# Patient Record
Sex: FEMALE | Race: WHITE | NOT HISPANIC OR LATINO | Employment: OTHER | URBAN - METROPOLITAN AREA
[De-identification: names, ages, dates, MRNs, and addresses within clinical notes are randomized per-mention and may not be internally consistent; named-entity substitution may affect disease eponyms.]

---

## 2017-06-26 ENCOUNTER — HOSPITAL ENCOUNTER (OUTPATIENT)
Dept: RADIOLOGY | Facility: MEDICAL CENTER | Age: 77
End: 2017-06-26

## 2020-01-01 ENCOUNTER — HOSPITAL ENCOUNTER (OUTPATIENT)
Facility: MEDICAL CENTER | Age: 80
DRG: 091 | End: 2020-04-12
Admitting: INTERNAL MEDICINE
Payer: MEDICARE

## 2020-01-01 ENCOUNTER — HOSPITAL ENCOUNTER (OUTPATIENT)
Dept: RADIOLOGY | Facility: MEDICAL CENTER | Age: 80
End: 2020-04-13
Payer: MEDICARE

## 2020-01-01 ENCOUNTER — APPOINTMENT (OUTPATIENT)
Dept: RADIOLOGY | Facility: MEDICAL CENTER | Age: 80
DRG: 091 | End: 2020-01-01
Attending: INTERNAL MEDICINE
Payer: MEDICARE

## 2020-01-01 ENCOUNTER — APPOINTMENT (OUTPATIENT)
Dept: CARDIOLOGY | Facility: MEDICAL CENTER | Age: 80
DRG: 091 | End: 2020-01-01
Attending: INTERNAL MEDICINE
Payer: MEDICARE

## 2020-01-01 ENCOUNTER — HOSPITAL ENCOUNTER (INPATIENT)
Facility: MEDICAL CENTER | Age: 80
LOS: 1 days | DRG: 091 | End: 2020-04-13
Attending: INTERNAL MEDICINE | Admitting: INTERNAL MEDICINE
Payer: MEDICARE

## 2020-01-01 VITALS
HEART RATE: 96 BPM | RESPIRATION RATE: 29 BRPM | TEMPERATURE: 98.7 F | WEIGHT: 160.94 LBS | SYSTOLIC BLOOD PRESSURE: 155 MMHG | HEIGHT: 60 IN | DIASTOLIC BLOOD PRESSURE: 67 MMHG | OXYGEN SATURATION: 48 % | BODY MASS INDEX: 31.6 KG/M2

## 2020-01-01 DIAGNOSIS — J96.00 ACUTE RESPIRATORY FAILURE, UNSP W HYPOXIA OR HYPERCAPNIA (HCC): ICD-10-CM

## 2020-01-01 DIAGNOSIS — I63.232 CEREBROVASCULAR ACCIDENT (CVA) DUE TO OCCLUSION OF LEFT CAROTID ARTERY (HCC): ICD-10-CM

## 2020-01-01 LAB
ACTION RANGE TRIGGERED IACRT: YES
ACTION RANGE TRIGGERED IACRT: YES
ALBUMIN SERPL BCP-MCNC: 3.2 G/DL (ref 3.2–4.9)
ALBUMIN/GLOB SERPL: 1.2 G/DL
ALP SERPL-CCNC: 116 U/L (ref 30–99)
ALT SERPL-CCNC: 43 U/L (ref 2–50)
ANION GAP SERPL CALC-SCNC: 12 MMOL/L (ref 7–16)
ANION GAP SERPL CALC-SCNC: 15 MMOL/L (ref 7–16)
APTT PPP: 22.4 SEC (ref 24.7–36)
AST SERPL-CCNC: 57 U/L (ref 12–45)
BASE EXCESS BLDA CALC-SCNC: -4 MMOL/L (ref -4–3)
BASE EXCESS BLDA CALC-SCNC: -4 MMOL/L (ref -4–3)
BASOPHILS # BLD AUTO: 0.2 % (ref 0–1.8)
BASOPHILS # BLD: 0.03 K/UL (ref 0–0.12)
BILIRUB SERPL-MCNC: 0.4 MG/DL (ref 0.1–1.5)
BODY TEMPERATURE: ABNORMAL DEGREES
BODY TEMPERATURE: ABNORMAL DEGREES
BUN SERPL-MCNC: 23 MG/DL (ref 8–22)
BUN SERPL-MCNC: 27 MG/DL (ref 8–22)
CALCIUM SERPL-MCNC: 8.6 MG/DL (ref 8.5–10.5)
CALCIUM SERPL-MCNC: 8.7 MG/DL (ref 8.5–10.5)
CHLORIDE SERPL-SCNC: 113 MMOL/L (ref 96–112)
CHLORIDE SERPL-SCNC: 93 MMOL/L (ref 96–112)
CO2 BLDA-SCNC: 27 MMOL/L (ref 20–33)
CO2 BLDA-SCNC: 27 MMOL/L (ref 20–33)
CO2 SERPL-SCNC: 20 MMOL/L (ref 20–33)
CO2 SERPL-SCNC: 23 MMOL/L (ref 20–33)
CREAT SERPL-MCNC: 1.05 MG/DL (ref 0.5–1.4)
CREAT SERPL-MCNC: 1.27 MG/DL (ref 0.5–1.4)
EKG IMPRESSION: NORMAL
EOSINOPHIL # BLD AUTO: 0 K/UL (ref 0–0.51)
EOSINOPHIL NFR BLD: 0 % (ref 0–6.9)
ERYTHROCYTE [DISTWIDTH] IN BLOOD BY AUTOMATED COUNT: 51.7 FL (ref 35.9–50)
GLOBULIN SER CALC-MCNC: 2.6 G/DL (ref 1.9–3.5)
GLUCOSE SERPL-MCNC: 121 MG/DL (ref 65–99)
GLUCOSE SERPL-MCNC: 143 MG/DL (ref 65–99)
HCO3 BLDA-SCNC: 25.1 MMOL/L (ref 17–25)
HCO3 BLDA-SCNC: 25.4 MMOL/L (ref 17–25)
HCT VFR BLD AUTO: 32.4 % (ref 37–47)
HGB BLD-MCNC: 10 G/DL (ref 12–16)
HOROWITZ INDEX BLDA+IHG-RTO: 192 MM[HG]
HOROWITZ INDEX BLDA+IHG-RTO: 337 MM[HG]
IMM GRANULOCYTES # BLD AUTO: 0.19 K/UL (ref 0–0.11)
IMM GRANULOCYTES NFR BLD AUTO: 1.1 % (ref 0–0.9)
INR PPP: 1.05 (ref 0.87–1.13)
INST. QUALIFIED PATIENT IIQPT: YES
INST. QUALIFIED PATIENT IIQPT: YES
LACTATE BLD-SCNC: 1.4 MMOL/L (ref 0.5–2)
LV EJECT FRACT  99904: 55
LV EJECT FRACT MOD 2C 99903: 46.65
LV EJECT FRACT MOD 4C 99902: 64.88
LV EJECT FRACT MOD BP 99901: 51.97
LYMPHOCYTES # BLD AUTO: 0.91 K/UL (ref 1–4.8)
LYMPHOCYTES NFR BLD: 5.3 % (ref 22–41)
MAGNESIUM SERPL-MCNC: 1.8 MG/DL (ref 1.5–2.5)
MCH RBC QN AUTO: 30.1 PG (ref 27–33)
MCHC RBC AUTO-ENTMCNC: 30.9 G/DL (ref 33.6–35)
MCV RBC AUTO: 97.6 FL (ref 81.4–97.8)
MONOCYTES # BLD AUTO: 0.82 K/UL (ref 0–0.85)
MONOCYTES NFR BLD AUTO: 4.8 % (ref 0–13.4)
NEUTROPHILS # BLD AUTO: 15.08 K/UL (ref 2–7.15)
NEUTROPHILS NFR BLD: 88.6 % (ref 44–72)
NRBC # BLD AUTO: 0 K/UL
NRBC BLD-RTO: 0 /100 WBC
O2/TOTAL GAS SETTING VFR VENT: 100 %
O2/TOTAL GAS SETTING VFR VENT: 100 %
OSMOLALITY SERPL: 335 MOSM/KG H2O (ref 278–298)
PCO2 BLDA: 63 MMHG (ref 26–37)
PCO2 BLDA: 65.9 MMHG (ref 26–37)
PCO2 TEMP ADJ BLDA: 62.3 MMHG (ref 26–37)
PCO2 TEMP ADJ BLDA: 65.9 MMHG (ref 26–37)
PH BLDA: 7.19 [PH] (ref 7.4–7.5)
PH BLDA: 7.21 [PH] (ref 7.4–7.5)
PH TEMP ADJ BLDA: 7.19 [PH] (ref 7.4–7.5)
PH TEMP ADJ BLDA: 7.21 [PH] (ref 7.4–7.5)
PHOSPHATE SERPL-MCNC: 2.9 MG/DL (ref 2.5–4.5)
PLATELET # BLD AUTO: 157 K/UL (ref 164–446)
PMV BLD AUTO: 11.2 FL (ref 9–12.9)
PO2 BLDA: 192 MMHG (ref 64–87)
PO2 BLDA: 337 MMHG (ref 64–87)
PO2 TEMP ADJ BLDA: 192 MMHG (ref 64–87)
PO2 TEMP ADJ BLDA: 336 MMHG (ref 64–87)
POTASSIUM SERPL-SCNC: 3.7 MMOL/L (ref 3.6–5.5)
POTASSIUM SERPL-SCNC: 3.9 MMOL/L (ref 3.6–5.5)
PROT SERPL-MCNC: 5.8 G/DL (ref 6–8.2)
PROTHROMBIN TIME: 14 SEC (ref 12–14.6)
RBC # BLD AUTO: 3.32 M/UL (ref 4.2–5.4)
SAO2 % BLDA: 100 % (ref 93–99)
SAO2 % BLDA: 99 % (ref 93–99)
SODIUM SERPL-SCNC: 128 MMOL/L (ref 135–145)
SODIUM SERPL-SCNC: 142 MMOL/L (ref 135–145)
SODIUM SERPL-SCNC: 148 MMOL/L (ref 135–145)
SODIUM SERPL-SCNC: 152 MMOL/L (ref 135–145)
SPECIMEN DRAWN FROM PATIENT: ABNORMAL
SPECIMEN DRAWN FROM PATIENT: ABNORMAL
TROPONIN T SERPL-MCNC: 117 NG/L (ref 6–19)
TROPONIN T SERPL-MCNC: 1931 NG/L (ref 6–19)
TROPONIN T SERPL-MCNC: 2344 NG/L (ref 6–19)
TROPONIN T SERPL-MCNC: 267 NG/L (ref 6–19)
TROPONIN T SERPL-MCNC: 759 NG/L (ref 6–19)
WBC # BLD AUTO: 17 K/UL (ref 4.8–10.8)

## 2020-01-01 PROCEDURE — 93306 TTE W/DOPPLER COMPLETE: CPT | Mod: 26 | Performed by: INTERNAL MEDICINE

## 2020-01-01 PROCEDURE — 700101 HCHG RX REV CODE 250: Performed by: INTERNAL MEDICINE

## 2020-01-01 PROCEDURE — 700105 HCHG RX REV CODE 258: Performed by: INTERNAL MEDICINE

## 2020-01-01 PROCEDURE — 700111 HCHG RX REV CODE 636 W/ 250 OVERRIDE (IP)

## 2020-01-01 PROCEDURE — 0BH18EZ INSERTION OF ENDOTRACHEAL AIRWAY INTO TRACHEA, VIA NATURAL OR ARTIFICIAL OPENING ENDOSCOPIC: ICD-10-PCS | Performed by: INTERNAL MEDICINE

## 2020-01-01 PROCEDURE — 83930 ASSAY OF BLOOD OSMOLALITY: CPT

## 2020-01-01 PROCEDURE — C1751 CATH, INF, PER/CENT/MIDLINE: HCPCS

## 2020-01-01 PROCEDURE — 85025 COMPLETE CBC W/AUTO DIFF WBC: CPT

## 2020-01-01 PROCEDURE — 93010 ELECTROCARDIOGRAM REPORT: CPT | Performed by: INTERNAL MEDICINE

## 2020-01-01 PROCEDURE — 94002 VENT MGMT INPAT INIT DAY: CPT

## 2020-01-01 PROCEDURE — 85730 THROMBOPLASTIN TIME PARTIAL: CPT

## 2020-01-01 PROCEDURE — 99292 CRITICAL CARE ADDL 30 MIN: CPT | Mod: 25 | Performed by: INTERNAL MEDICINE

## 2020-01-01 PROCEDURE — 85610 PROTHROMBIN TIME: CPT

## 2020-01-01 PROCEDURE — 99152 MOD SED SAME PHYS/QHP 5/>YRS: CPT

## 2020-01-01 PROCEDURE — 82803 BLOOD GASES ANY COMBINATION: CPT

## 2020-01-01 PROCEDURE — 84484 ASSAY OF TROPONIN QUANT: CPT | Mod: 91

## 2020-01-01 PROCEDURE — 93005 ELECTROCARDIOGRAM TRACING: CPT | Performed by: INTERNAL MEDICINE

## 2020-01-01 PROCEDURE — 302214 INTUBATION BOX: Performed by: INTERNAL MEDICINE

## 2020-01-01 PROCEDURE — 84295 ASSAY OF SERUM SODIUM: CPT | Mod: 91

## 2020-01-01 PROCEDURE — 83735 ASSAY OF MAGNESIUM: CPT

## 2020-01-01 PROCEDURE — 02HV33Z INSERTION OF INFUSION DEVICE INTO SUPERIOR VENA CAVA, PERCUTANEOUS APPROACH: ICD-10-PCS | Performed by: INTERNAL MEDICINE

## 2020-01-01 PROCEDURE — 700111 HCHG RX REV CODE 636 W/ 250 OVERRIDE (IP): Performed by: INTERNAL MEDICINE

## 2020-01-01 PROCEDURE — 93306 TTE W/DOPPLER COMPLETE: CPT

## 2020-01-01 PROCEDURE — 31500 INSERT EMERGENCY AIRWAY: CPT | Performed by: INTERNAL MEDICINE

## 2020-01-01 PROCEDURE — 36556 INSERT NON-TUNNEL CV CATH: CPT

## 2020-01-01 PROCEDURE — 36556 INSERT NON-TUNNEL CV CATH: CPT | Mod: LT | Performed by: INTERNAL MEDICINE

## 2020-01-01 PROCEDURE — 770022 HCHG ROOM/CARE - ICU (200)

## 2020-01-01 PROCEDURE — 83605 ASSAY OF LACTIC ACID: CPT

## 2020-01-01 PROCEDURE — 80048 BASIC METABOLIC PNL TOTAL CA: CPT

## 2020-01-01 PROCEDURE — 99291 CRITICAL CARE FIRST HOUR: CPT | Mod: 25 | Performed by: INTERNAL MEDICINE

## 2020-01-01 PROCEDURE — 31500 INSERT EMERGENCY AIRWAY: CPT

## 2020-01-01 PROCEDURE — 5A1935Z RESPIRATORY VENTILATION, LESS THAN 24 CONSECUTIVE HOURS: ICD-10-PCS | Performed by: INTERNAL MEDICINE

## 2020-01-01 PROCEDURE — 84100 ASSAY OF PHOSPHORUS: CPT

## 2020-01-01 PROCEDURE — 71045 X-RAY EXAM CHEST 1 VIEW: CPT

## 2020-01-01 PROCEDURE — 80053 COMPREHEN METABOLIC PANEL: CPT

## 2020-01-01 RX ORDER — ATORVASTATIN CALCIUM 40 MG/1
40 TABLET, FILM COATED ORAL EVERY EVENING
Status: DISCONTINUED | OUTPATIENT
Start: 2020-01-01 | End: 2020-01-01

## 2020-01-01 RX ORDER — ASPIRIN 81 MG/1
81 TABLET, CHEWABLE ORAL DAILY
Status: DISCONTINUED | OUTPATIENT
Start: 2020-01-01 | End: 2020-01-01

## 2020-01-01 RX ORDER — LORAZEPAM 2 MG/ML
1 CONCENTRATE ORAL
Status: DISCONTINUED | OUTPATIENT
Start: 2020-01-01 | End: 2020-01-01 | Stop reason: HOSPADM

## 2020-01-01 RX ORDER — FAMOTIDINE 20 MG/1
20 TABLET, FILM COATED ORAL EVERY 12 HOURS
Status: DISCONTINUED | OUTPATIENT
Start: 2020-01-01 | End: 2020-01-01

## 2020-01-01 RX ORDER — MANNITOL 250 MG/ML
INJECTION, SOLUTION INTRAVENOUS
Status: COMPLETED
Start: 2020-01-01 | End: 2020-01-01

## 2020-01-01 RX ORDER — AMOXICILLIN 250 MG
2 CAPSULE ORAL 2 TIMES DAILY
Status: DISCONTINUED | OUTPATIENT
Start: 2020-01-01 | End: 2020-01-01

## 2020-01-01 RX ORDER — 3% SODIUM CHLORIDE 3 G/100ML
INJECTION, SOLUTION INTRAVENOUS CONTINUOUS
Status: DISCONTINUED | OUTPATIENT
Start: 2020-01-01 | End: 2020-01-01

## 2020-01-01 RX ORDER — POLYETHYLENE GLYCOL 3350 17 G/17G
1 POWDER, FOR SOLUTION ORAL
Status: DISCONTINUED | OUTPATIENT
Start: 2020-01-01 | End: 2020-01-01

## 2020-01-01 RX ORDER — MAGNESIUM SULFATE HEPTAHYDRATE 40 MG/ML
2 INJECTION, SOLUTION INTRAVENOUS ONCE
Status: COMPLETED | OUTPATIENT
Start: 2020-01-01 | End: 2020-01-01

## 2020-01-01 RX ORDER — FAMOTIDINE 20 MG/1
20 TABLET, FILM COATED ORAL EVERY 24 HOURS
Status: DISCONTINUED | OUTPATIENT
Start: 2020-01-01 | End: 2020-01-01

## 2020-01-01 RX ORDER — MORPHINE SULFATE 10 MG/ML
5 INJECTION, SOLUTION INTRAMUSCULAR; INTRAVENOUS
Status: DISCONTINUED | OUTPATIENT
Start: 2020-01-01 | End: 2020-01-01 | Stop reason: HOSPADM

## 2020-01-01 RX ORDER — 3% SODIUM CHLORIDE 3 G/100ML
500 INJECTION, SOLUTION INTRAVENOUS CONTINUOUS
Status: DISCONTINUED | OUTPATIENT
Start: 2020-01-01 | End: 2020-01-01

## 2020-01-01 RX ORDER — LORAZEPAM 2 MG/ML
2-4 INJECTION INTRAMUSCULAR
Status: DISCONTINUED | OUTPATIENT
Start: 2020-01-01 | End: 2020-01-01 | Stop reason: HOSPADM

## 2020-01-01 RX ORDER — ONDANSETRON 4 MG/1
8 TABLET, ORALLY DISINTEGRATING ORAL EVERY 8 HOURS PRN
Status: DISCONTINUED | OUTPATIENT
Start: 2020-01-01 | End: 2020-01-01 | Stop reason: HOSPADM

## 2020-01-01 RX ORDER — ETOMIDATE 2 MG/ML
20 INJECTION INTRAVENOUS ONCE
Status: COMPLETED | OUTPATIENT
Start: 2020-01-01 | End: 2020-01-01

## 2020-01-01 RX ORDER — ATROPINE SULFATE 10 MG/ML
2 SOLUTION/ DROPS OPHTHALMIC EVERY 4 HOURS PRN
Status: DISCONTINUED | OUTPATIENT
Start: 2020-01-01 | End: 2020-01-01 | Stop reason: HOSPADM

## 2020-01-01 RX ORDER — MORPHINE SULFATE 10 MG/ML
10 INJECTION, SOLUTION INTRAMUSCULAR; INTRAVENOUS
Status: DISCONTINUED | OUTPATIENT
Start: 2020-01-01 | End: 2020-01-01 | Stop reason: HOSPADM

## 2020-01-01 RX ORDER — NOREPINEPHRINE BITARTRATE 1 MG/ML
INJECTION, SOLUTION INTRAVENOUS
Status: ACTIVE
Start: 2020-01-01 | End: 2020-01-01

## 2020-01-01 RX ORDER — ONDANSETRON 2 MG/ML
8 INJECTION INTRAMUSCULAR; INTRAVENOUS EVERY 8 HOURS PRN
Status: DISCONTINUED | OUTPATIENT
Start: 2020-01-01 | End: 2020-01-01 | Stop reason: HOSPADM

## 2020-01-01 RX ORDER — POTASSIUM CHLORIDE 29.8 MG/ML
40 INJECTION INTRAVENOUS ONCE
Status: COMPLETED | OUTPATIENT
Start: 2020-01-01 | End: 2020-01-01

## 2020-01-01 RX ORDER — MAGNESIUM SULFATE HEPTAHYDRATE 40 MG/ML
2 INJECTION, SOLUTION INTRAVENOUS ONCE
Status: DISCONTINUED | OUTPATIENT
Start: 2020-01-01 | End: 2020-01-01

## 2020-01-01 RX ORDER — HEPARIN SODIUM 5000 [USP'U]/ML
5000 INJECTION, SOLUTION INTRAVENOUS; SUBCUTANEOUS EVERY 8 HOURS
Status: DISCONTINUED | OUTPATIENT
Start: 2020-01-01 | End: 2020-01-01

## 2020-01-01 RX ORDER — MANNITOL 250 MG/ML
50 INJECTION, SOLUTION INTRAVENOUS ONCE
Status: COMPLETED | OUTPATIENT
Start: 2020-01-01 | End: 2020-01-01

## 2020-01-01 RX ORDER — ROCURONIUM BROMIDE 10 MG/ML
50 INJECTION, SOLUTION INTRAVENOUS ONCE
Status: COMPLETED | OUTPATIENT
Start: 2020-01-01 | End: 2020-01-01

## 2020-01-01 RX ORDER — IPRATROPIUM BROMIDE AND ALBUTEROL SULFATE 2.5; .5 MG/3ML; MG/3ML
3 SOLUTION RESPIRATORY (INHALATION)
Status: DISCONTINUED | OUTPATIENT
Start: 2020-01-01 | End: 2020-01-01

## 2020-01-01 RX ORDER — BISACODYL 10 MG
10 SUPPOSITORY, RECTAL RECTAL
Status: DISCONTINUED | OUTPATIENT
Start: 2020-01-01 | End: 2020-01-01

## 2020-01-01 RX ADMIN — FENTANYL CITRATE 100 MCG: 0.05 INJECTION, SOLUTION INTRAMUSCULAR; INTRAVENOUS at 02:22

## 2020-01-01 RX ADMIN — MANNITOL 50 G: 12.5 INJECTION, SOLUTION INTRAVENOUS at 02:18

## 2020-01-01 RX ADMIN — MANNITOL 50 G: 250 INJECTION, SOLUTION INTRAVENOUS at 02:18

## 2020-01-01 RX ADMIN — POTASSIUM CHLORIDE 40 MEQ: 400 INJECTION, SOLUTION INTRAVENOUS at 12:04

## 2020-01-01 RX ADMIN — MORPHINE SULFATE 10 MG: 10 INJECTION INTRAVENOUS at 18:45

## 2020-01-01 RX ADMIN — Medication 100 MCG/HR: at 06:35

## 2020-01-01 RX ADMIN — PROPOFOL 20 MCG/KG/MIN: 10 INJECTION, EMULSION INTRAVENOUS at 03:59

## 2020-01-01 RX ADMIN — ETOMIDATE 20 MG: 2 INJECTION INTRAVENOUS at 01:57

## 2020-01-01 RX ADMIN — FAMOTIDINE 20 MG: 10 INJECTION INTRAVENOUS at 05:41

## 2020-01-01 RX ADMIN — ROCURONIUM BROMIDE 50 MG: 10 INJECTION, SOLUTION INTRAVENOUS at 01:57

## 2020-01-01 RX ADMIN — NOREPINEPHRINE BITARTRATE 5 MCG/MIN: 1 INJECTION INTRAVENOUS at 06:11

## 2020-01-01 RX ADMIN — SODIUM CHLORIDE 200 MEQ: 234 INJECTION INTRAMUSCULAR; INTRAVENOUS; SUBCUTANEOUS at 02:32

## 2020-01-01 RX ADMIN — SODIUM CHLORIDE: 3 INJECTION, SOLUTION INTRAVENOUS at 04:38

## 2020-01-01 RX ADMIN — LORAZEPAM 4 MG: 2 INJECTION INTRAMUSCULAR; INTRAVENOUS at 18:14

## 2020-01-01 RX ADMIN — MORPHINE SULFATE 10 MG: 10 INJECTION INTRAVENOUS at 18:14

## 2020-01-01 RX ADMIN — FENTANYL CITRATE 50 MCG: 0.05 INJECTION, SOLUTION INTRAMUSCULAR; INTRAVENOUS at 02:02

## 2020-01-01 RX ADMIN — FENTANYL CITRATE 50 MCG: 0.05 INJECTION, SOLUTION INTRAMUSCULAR; INTRAVENOUS at 01:55

## 2020-01-01 RX ADMIN — MAGNESIUM SULFATE 2 G: 2 INJECTION INTRAVENOUS at 08:07

## 2020-04-13 PROBLEM — J96.01 ACUTE RESPIRATORY FAILURE WITH HYPOXIA AND HYPERCAPNIA (HCC): Status: ACTIVE | Noted: 2020-01-01

## 2020-04-13 PROBLEM — E83.42 HYPOMAGNESEMIA: Status: ACTIVE | Noted: 2020-01-01

## 2020-04-13 PROBLEM — J96.02 ACUTE RESPIRATORY FAILURE WITH HYPOXIA AND HYPERCAPNIA (HCC): Status: ACTIVE | Noted: 2020-01-01

## 2020-04-13 PROBLEM — I21.09 ACUTE MI, ANTEROLATERAL WALL (HCC): Status: ACTIVE | Noted: 2020-01-01

## 2020-04-13 PROBLEM — T81.9XXA POST-OPERATIVE COMPLICATION: Status: ACTIVE | Noted: 2020-01-01

## 2020-04-13 PROBLEM — I63.232 ARTERIAL ISCHEMIC STROKE, ICA (INTERNAL CAROTID ARTERY), LEFT, ACUTE (HCC): Status: ACTIVE | Noted: 2020-01-01

## 2020-04-13 NOTE — PROGRESS NOTES
Received transfer request from [facility]  Sending Physician: Dr. Bojorquez  Diagnosis: Ischemic Stroke with Early Herniation  Type of transfer requested: Inpatient to inpatient  Specialist Consulted: Dr. Luo- Neurosurgery  Patient Accepted  Accepting Physician: Dr. Krishna  Patient coming via: [facility]  ETA: 0130  Room Assignment: R103  Nursing to notify Dr. Krishna upon patient arrival to unit.

## 2020-04-13 NOTE — ASSESSMENT & PLAN NOTE
Diffuse ST depressions with rise in troponin  Due to patient's large ischemic stroke, starting heparin is at high risk of hemorrhagic conversion  High dose statin  ASA 81mg   
Impending herniation  S/p 23% saline bolus and mannitol  Continue 3% saline for Na goal at 150-160  SBP goal > 140 with levophed titration  No NSG interventions at this time  Continue high rate on vent  Will start low dose aspirin for ischemic stroke and NSTEMI  High statin  Poor prognosis  
Intubated earlier this morning on 4/13  Continue full vent support  Cont RT/O2 protocols  Continue vent bundle protocols  I am actively adjusting vent settings based on ABGs  No SBT for now  
Replete aggressively for goal >2  
S/p left CEA at [facility] and showed evidence of a perioperative stroke. Despite clot removal and take back to the operating room, she suffered a rapidly  progressive non-hemorrhagic stroke at the outside facility with a midline shift by CT  
27.7

## 2020-04-13 NOTE — PROCEDURES
Central Line Insertion  Date/Time: 4/13/2020 3:15 AM  Performed by: Christopher Krishna M.D.  Authorized by: Christopher Krishna M.D.     Consent:     Consent obtained:  Emergent situation    Risks discussed:  Arterial puncture, incorrect placement, nerve damage, pneumothorax, infection and bleeding    Alternatives discussed:  Delayed treatment  Pre-procedure details:     Hand hygiene: Hand hygiene performed prior to insertion      Sterile barrier technique: All elements of maximal sterile technique followed      Skin preparation:  ChloraPrep    Skin preparation agent: Skin preparation agent completely dried prior to procedure    Sedation:     Sedation type:  Deep  Anesthesia:     Anesthesia method:  None  Procedure details:     Location:  L subclavian    Patient position:  Flat    Procedural supplies:  Triple lumen    Catheter size:  7.5 Fr    Landmarks identified: yes      Ultrasound guidance: no      Number of attempts:  1    Successful placement: yes    Post-procedure details:     Post-procedure:  Dressing applied and line sutured    Assessment:  Blood return through all ports, free fluid flow and no pneumothorax on x-ray  Comments:      First attempt line went up into IJ, rewired and line went  into distal SVC/ right atrium, no complications

## 2020-04-13 NOTE — PALLIATIVE CARE
PALLIATIVE CARE FOLLOW-UP:    Call to PC office from ICU RN - pt's family changed mind re:  visit, and now would like one. Spiritual care order placed/spoke with Chaphugo Valero who plans to come to bedside soon.    Thank you for allowing Palliative Care to support this pt and her family.  Contact [phone] for additional assistance, patient status change, questions or concerns.

## 2020-04-13 NOTE — PALLIATIVE CARE
Palliative Care follow-up  PC RN reached out to pt's son Alejandro. Alejandro is agreeable and would appreciate phone conference. Plan for conference today at 3pm via phone.       Updated: Gala PUTNAM RN and Dr. Zafar    Plan: Family conference today at 3pm.     Thank you for allowing Palliative Care to support this patient and family. Contact [phone] for additional assistance, change in patient status, or with any questions/concerns.

## 2020-04-13 NOTE — PROGRESS NOTES
Interval history  Patient had elevated troponins in the 100s, ST elevation is persistent seen on EKG exam is improved she is able to withdraw bilateral lower extremities and left upper extremity she does not follow commands she has cranial nerve reflexes at this time she is on the vent    Physical examination  She is intubated she has minimal sedation on board she does not follow commands does not open eyes she has a GCS of 6T  Movement patient has left upper extremity bilateral lower extremities withdraws to pain no movement right upper extremity  Pupils remain 4-2 bilaterally she has full cranial nerve reflexes    Lab Results   Component Value Date/Time    SODIUM 148 (H) 04/13/2020 09:45 AM    POTASSIUM 3.7 04/13/2020 09:45 AM    CHLORIDE 113 (H) 04/13/2020 09:45 AM    CO2 20 04/13/2020 09:45 AM    GLUCOSE 143 (H) 04/13/2020 09:45 AM    BUN 27 (H) 04/13/2020 09:45 AM    CREATININE 1.27 04/13/2020 09:45 AM      Imaging no new imaging    Assessment and plan  At this point the patient remains a poor surgical candidate she has significantly elevated troponins consistent with myocardial infarction potentially due to neurogenic stress versus vasculopathy.  Her exam remains poor she is not responsive not following commands and has a stable physical examination from that of her pre-intubation examination from earlier today.    At this time the patient is a poor candidate for intervention no neurosurgical intervention surgically she was precluded from going to the OR as she may have cardiac demise due to the stress of the surgery.  Additionally the patient is coagulopathic from antiplatelet therapy from her CEA.  Which would require platelet therapy being administered and likely exacerbate her ongoing MI.  We will continue with following recommendations  Maximal medical management including sodium goals of 1 55-1 65  Keppra 500 twice daily  Stroke neurology consultation    At this time the patient is not a candidate for  ventriculostomy or decompressive hemicraniectomy given active cardiac instability coagulopathy and lack of radiographic evidence requiring decompressive hemicraniectomy at this time.    Christopher Luo MD  A total of 50 minutes was spent in addition to her consultation note evaluating and direct patient care coordination

## 2020-04-13 NOTE — PROGRESS NOTES
Critical Care Progress Note    Date of admission  4/13/2020    Chief Complaint  80 y.o. female admitted 4/13/2020 with acute hypoxic respiratory failure, neurogenic shock, and NSTEMI after massive left hemispheric ischemic stroke    Hospital Course    Ms. Carver is an 80 year old lady with the past medical history significant for AAA s/p stent graft in 2015, hypertension, hypothyroidism, PVD who underwent a previous right CEA in 11/2019, but underwent left CEA on 4/11/2020 for critical stenosis.  According to chart notes from [facility], the patient had decreased movement to the RUE/RLE immediately post-operatively.  An ultrasound showed thrombus in the left ICA and was given additional heparin and taken back to the OR for clot removal.  Over the course of the next 12- 24 hours on 4/12, the patient had worsening stroke symptoms of flaccid paralysis on the RUE/RLE and had worsening responsiveness/non-verbal.  A CT head from [facility] 5 hours prior to transfer to [facility] showed a large subacute nonhemorrhagic stroke involving the left front, parietal, and temporal lobes in the distribution of the left MCA with mass effect and midline shift towards the right of 3mm.  The patient was transferred from [facility] for neurosurgery evaluation.  She was immediately intubated upon arrival.  NSG was consulted and could not offer any surgical interventions due to the patient's instability.      Interval Problem Update  Reviewed last 24 hour events:   - transferred from [facility] this morning   - intubated this morning due to hypoxia and not protecting airway   - RUE flaccid, not following   - Fent at 50   - Levo at 9   - 3% at 45cc/hr   - SR 80s   - SBP >140   - afebrile   - NPO   - No BM   - UOP of 30-40cc/hr with gamino   - left subclavian TLC   - vent day #1   - CXR(reviewed): ?cardiomegaly, mild cephalization   - no SBTs   - Mg at 1.8   - K at 3.9   - serum osmolality at 335   - troponin 117-->267-->759-->1931      Review of Systems  Review of  Systems   Unable to perform ROS: Intubated        Vital Signs for last 24 hours   Temp:  [37 °C (98.6 °F)-37.1 °C (98.7 °F)] 37 °C (98.6 °F)  Pulse:  [106-120] 111  Resp:  [9-135] 21  BP: (122-165)/(56-73) 131/56  SpO2:  [91 %-98 %] 98 %    Hemodynamic parameters for last 24 hours       Respiratory Information for the last 24 hours  Vent Mode: APVCMV  Rate (breaths/min): 24  Vt Target (mL): 310  PEEP/CPAP: 8  MAP: 13  Control VTE (exp VT): 308    Physical Exam   Physical Exam  Vitals signs and nursing note reviewed.   Constitutional:       Appearance: Normal appearance. She is ill-appearing. She is not toxic-appearing or diaphoretic.      Comments: Appears stated age   HENT:      Head: Normocephalic and atraumatic.      Right Ear: External ear normal.      Left Ear: External ear normal.      Nose: Nose normal. No rhinorrhea.      Mouth/Throat:      Mouth: Mucous membranes are moist.      Comments: ETT in place  Eyes:      General: No scleral icterus.     Extraocular Movements: Extraocular movements intact.      Conjunctiva/sclera: Conjunctivae normal.      Pupils: Pupils are equal, round, and reactive to light.   Neck:      Musculoskeletal: Normal range of motion and neck supple.   Cardiovascular:      Rate and Rhythm: Normal rate and regular rhythm.      Pulses: Normal pulses.      Heart sounds: Normal heart sounds. No murmur.   Pulmonary:      Effort: No respiratory distress.      Breath sounds: Normal breath sounds.      Comments: Breathing comfortably on the vent, clear  Chest:      Chest wall: No tenderness.   Abdominal:      General: Abdomen is flat. There is no distension.      Palpations: Abdomen is soft.      Tenderness: There is no abdominal tenderness. There is no guarding or rebound.      Comments: Hypoactive bowel sounds   Musculoskeletal: Normal range of motion.      Right lower leg: No edema.      Left lower leg: No edema.   Lymphadenopathy:      Cervical: No cervical adenopathy.   Skin:     General:  Skin is warm and dry.      Capillary Refill: Capillary refill takes less than 2 seconds.      Findings: No rash.   Neurological:      Comments: Not following commands, strong cough, faint corneal reflex, no gag, RUE flaccid, withdraws x 3 on other extremities   Psychiatric:      Comments: Unable to assess         Medications  Current Facility-Administered Medications   Medication Dose Route Frequency Provider Last Rate Last Dose   • NOREPINEPHRINE BITARTRATE 1 MG/ML IV SOLN        Stopped at 04/13/20 0200   • norepinephrine (LEVOPHED) 8 mg in  mL Infusion  0-30 mcg/min Intravenous Continuous Christopher Krishna M.D. 13.1 mL/hr at 04/13/20 0629 7 mcg/min at 04/13/20 0629   • Respiratory Therapy Consult   Nebulization Continuous RT Christopher Krishna M.D.       • ipratropium-albuterol (DUONEB) nebulizer solution  3 mL Nebulization Q2HRS PRN (RT) Christopher Krishna M.D.       • senna-docusate (PERICOLACE or SENOKOT S) 8.6-50 MG per tablet 2 Tab  2 Tab Enteral Tube BID Christopher Krishna M.D.   Stopped at 04/13/20 0600    And   • polyethylene glycol/lytes (MIRALAX) PACKET 1 Packet  1 Packet Enteral Tube QDAY PRN Christopher Krishna M.D.        And   • magnesium hydroxide (MILK OF MAGNESIA) suspension 30 mL  30 mL Enteral Tube QDAY PRN Christopher Krishna M.D.        And   • bisacodyl (DULCOLAX) suppository 10 mg  10 mg Rectal QDAY PRN Christopher Krishna M.D.       • MD Alert...ICU Electrolyte Replacement per Pharmacy   Other PHARMACY TO DOSE Christopher Krishna M.D.       • lidocaine (XYLOCAINE) 1 % injection 1-2 mL  1-2 mL Tracheal Tube Q30 MIN PRN Christopher Krishna M.D.       • fentaNYL (SUBLIMAZE) injection 50 mcg  50 mcg Intravenous Q15 MIN PRN Christopher Krishna M.D.        And   • fentaNYL (SUBLIMAZE) injection 100 mcg  100 mcg Intravenous Q15 MIN PRN Christopher Krishna M.D.        And   • fentaNYL (SUBLIMAZE) 50 mcg/mL in 50mL (Continuous Infusion)   Intravenous Continuous Christopher Krishna M.D. 2 mL/hr at 04/13/20 0635  100 mcg/hr at 04/13/20 0635    And   • propofol (DIPRIVAN) injection  0-40 mcg/kg/min Intravenous Continuous Christopher Krishna M.D.   Stopped at 04/13/20 0626   • 3% sodium chloride (HYPERTONIC SALINE) 500mL infusion   Intravenous Continuous Christopher Krishna M.D. 45 mL/hr at 04/13/20 0438     • famotidine (PEPCID) injection 20 mg  20 mg Intravenous Q24HRS Christopher Krishna M.D.   20 mg at 04/13/20 0541    Or   • famotidine (PEPCID) tablet 20 mg  20 mg Enteral Tube Q24HRS Christopher Krishna M.D.           Fluids    Intake/Output Summary (Last 24 hours) at 4/13/2020 0705  Last data filed at 4/13/2020 0600  Gross per 24 hour   Intake 129.25 ml   Output 160 ml   Net -30.75 ml       Laboratory  Recent Labs     04/13/20  0246 04/13/20  0418   ISTATAPH 7.193* 7.208*   ISTATAPCO2 65.9* 63.0*   ISTATAPO2 192* 337*   ISTATATCO2 27 27   YTAVRDH6RLH 99 100*   ISTATARTHCO3 25.4* 25.1*   ISTATARTBE -4 -4   ISTATTEMP 98.6 F 98.1 F   ISTATFIO2 100 100   ISTATSPEC Arterial Arterial   ISTATAPHTC 7.193* 7.212*   [id number] 192* 336*         Recent Labs     04/13/20  0220 04/13/20  0545   SODIUM 128* 142   POTASSIUM 3.9  --    CHLORIDE 93*  --    CO2 23  --    BUN 23*  --    CREATININE 1.05  --    MAGNESIUM 1.8  --    PHOSPHORUS 2.9  --    CALCIUM 8.7  --      Recent Labs     04/13/20 0220   ALTSGPT 43   ASTSGOT 57*   ALKPHOSPHAT 116*   TBILIRUBIN 0.4   GLUCOSE 121*     Recent Labs     04/13/20 0220   WBC 17.0*   NEUTSPOLYS 88.60*   LYMPHOCYTES 5.30*   MONOCYTES 4.80   EOSINOPHILS 0.00   BASOPHILS 0.20   ASTSGOT 57*   ALTSGPT 43   ALKPHOSPHAT 116*   TBILIRUBIN 0.4     Recent Labs     04/13/20  0220   RBC 3.32*   HEMOGLOBIN 10.0*   HEMATOCRIT 32.4*   PLATELETCT 157*   PROTHROMBTM 14.0   APTT 22.4*   INR 1.05       Imaging  ECHO:    CONCLUSIONS  No prior study is available for comparison.   Basal inferoposterior hypokinesis.  Left ventricular systolic function is low normal.  Left ventricular ejection fraction is visually estimated to  be 55%.  Normal left ventricular chamber size.  Mild mitral regurgitation.  Mild to moderately dilated left atrium.  Mild aortic stenosis.     Moderate aortic insufficiency.  Moderate to severe tricuspid regurgitation.  Dilated inferior vena cava without inspiratory collapse.  Right ventricular systolic pressure is estimated to be 80-85  mmHg.  Enlarged right atrium.  Normal right ventricular size and systolic function.    Assessment/Plan  Hypomagnesemia- (present on admission)  Assessment & Plan  Replete aggressively for goal >2    Acute MI, anterolateral wall (HCC)  Assessment & Plan  Diffuse ST depressions with rise in troponin  Due to patient's large ischemic stroke, starting heparin is at high risk of hemorrhagic conversion  High dose statin  ASA 81mg     Post-operative complication  Assessment & Plan  S/p left CEA at [facility] and showed evidence of a perioperative stroke. Despite clot removal and take back to the operating room, she suffered a rapidly  progressive non-hemorrhagic stroke at the outside facility with a midline shift by CT    Acute respiratory failure with hypoxia and hypercapnia (HCC)  Assessment & Plan  Intubated earlier this morning on 4/13  Continue full vent support  Cont RT/O2 protocols  Continue vent bundle protocols  I am actively adjusting vent settings based on ABGs  No SBT for now    Arterial ischemic stroke, ICA (internal carotid artery), left, acute (HCC)- (present on admission)  Assessment & Plan  Impending herniation  S/p 23% saline bolus and mannitol  Continue 3% saline for Na goal at 150-160  SBP goal > 140 with levophed titration  No NSG interventions at this time  Continue high rate on vent  Will start low dose aspirin for ischemic stroke and NSTEMI  High statin  Poor prognosis       VTE:  Contraindicated  Ulcer: H2 Antagonist  Lines: Central Line  Ongoing indication addressed and Miranda Catheter  Ongoing indication addressed    The patient remains critically ill.  I have assessed  and reassessed the respiratory status and made ventilator adjustments based upon arterial blood gas analysis, ventilator waveforms and airway mechanics.  I have assessed and reassessed the blood pressure, hemodynamics, cardiovascular status. This patient remains at high risk for worsening cardiopulmonary dysfunction and death without the above critical care interventions.    Several discussions with family about goals of care.  Family leaning towards comfort measures; however, will continue full treatment for now.    Discussed patient condition and risk of morbidity and/or mortality with Family, RN, RT, Pharmacy, Code status disscussed, Charge nurse / hot rounds and neurosurgery  The patient remains critically ill.  Additional critical care time = 67 minutes in directly providing and coordinating critical care and extensive data review.  No time overlap and excludes procedures.

## 2020-04-13 NOTE — CONSULTS
Critical Care Consultation    Date of consult: 4/13/2020    Referring Physician  Christopher Luo M.D.    Reason for Consultation  Acute stroke    History of Presenting Illness  80 y.o. female who presented 4/13/2020 to this hospital in transfer from [facility] with a massive left hemispheric, right-sided stroke.  Patient underwent a right carotid endarterectomy on 11/22/2019.  He was admitted to the outside facility on 411 for left carotid endarterectomy secondary to critical stenosis and pre-existing diminished right-sided function.  Per outside report patient had decreased movement in the right side immediately post endarterectomy.  Ultrasound showed thrombus in the left ICA she was given additional heparin taken back to surgery for postop clot removal admitted to the ICU.  Patient was noted to have a high blood pressure and was unable to give a verbal history.  She was transferred from the floor to the ICU.  Over the next 12 to 24 hours her stroke symptoms worsened and she developed flaccid paralysis on the right side and she became poorly responsive/ non-verbal.  CT of the head taken about 5 hours prior to transfer to [facility] from the outside facility shows a large sub-acute nonhemorrhagic stroke involving the left frontal, parietal and temporal lobes in the distribution of the left middle cerebral artery with mass-effect and midline shift towards the right of 3 mm.  Case was discussed with Dr. Luo from neurosurgery on-call at [facility]. Dr Luo noted he was willing to have the patient transferred here for evaluation for possible hemicraniotomy.  I was then called. On review of the case provided to me by the outside physician I recommended to the outside physicians that he discuss again goals of care with the patient's son who is reported to be the DPOA.  I observed that in this 80-year-old with a rapidly progressive stroke showing signs of early brain herniation her prognosis for survival was poor and her  prognosis for survival to independent function was grim.  Reportedly the son requested aggressive interventions nonetheless to the referring physicians and the patient was transferred here for a higher level of care.      I also recommended to the outside physicians that the patient be intubated prior to transfer.  On arrival here the patient was not intubated but had grunting respirations, flaccid in all 4 extremities with pinpoint pupils, not responsive to verbal stimuli and poor airway protection (GCS 4).  I intubated her immediately gave her mannitol 50 and hypertonic saline 23.4%, 30 cc over 10 minutes. I reviewed the case in detail with Dr. Luo at the bedside.  Her EKG shows diffuse ST depressions consistent with ischemic changes/ acute MI or possible stroke-neurologic EKG findings.  Her troponin is elevated. Dr Luo feels that she is a poor surgical candidate for a hemicraniotomy and he recommends medical management. He further recommends that we change to end-of-life care soon if she does not show signs of neurologic improvement.    Code Status  Full (son not available by phone tonight despite multiple calls)    Review of Systems  ROS    Past Medical History   has a past medical history of AAA (abdominal aortic aneurysm) (10/2015), Disorder of thyroid, Diverticulitis, Hiatus hernia syndrome, Hypertension, and Shingles (9/2014).    Surgical History   has a past surgical history that includes other abdominal surgery (1992) and aaa with stent graft (10/5/2015).    Family History  family history is not on file.    Social History   reports that she has been smoking cigarettes. She has a 13.00 pack-year smoking history. She has never used smokeless tobacco. She reports that she does not drink alcohol or use drugs.    Medications  Home Medications    **Home medications have not yet been reviewed for this encounter**       Current Facility-Administered Medications   Medication Dose Route Frequency Provider Last  Rate Last Dose   • NOREPINEPHRINE BITARTRATE 1 MG/ML IV SOLN        Stopped at 04/13/20 0200   • norepinephrine (LEVOPHED) 8 mg in  mL Infusion  0-30 mcg/min Intravenous Continuous Christopher Krishna M.D.       • Respiratory Therapy Consult   Nebulization Continuous RT Christopher Krishna M.D.       • ipratropium-albuterol (DUONEB) nebulizer solution  3 mL Nebulization Q2HRS PRN (RT) Christopher Krishna M.D.       • famotidine (PEPCID) tablet 20 mg  20 mg Enteral Tube Q12HRS Christopher Krishna M.D.        Or   • famotidine (PEPCID) injection 20 mg  20 mg Intravenous Q12HRS Christopher Krishna M.D.       • senna-docusate (PERICOLACE or SENOKOT S) 8.6-50 MG per tablet 2 Tab  2 Tab Enteral Tube BID Christopher Krishna M.D.        And   • polyethylene glycol/lytes (MIRALAX) PACKET 1 Packet  1 Packet Enteral Tube QDAY PRN Christopher Krishna M.D.        And   • magnesium hydroxide (MILK OF MAGNESIA) suspension 30 mL  30 mL Enteral Tube QDAY PRN Christopher Krishna M.D.        And   • bisacodyl (DULCOLAX) suppository 10 mg  10 mg Rectal QDAY PRN Christopher Krishna M.D.       • MD Alert...ICU Electrolyte Replacement per Pharmacy   Other PHARMACY TO DOSE Christopher Krishna M.D.       • lidocaine (XYLOCAINE) 1 % injection 1-2 mL  1-2 mL Tracheal Tube Q30 MIN PRN Christopher Krishna M.D.       • fentaNYL (SUBLIMAZE) injection 50 mcg  50 mcg Intravenous Q15 MIN PRN Christopher Krishna M.D.        And   • fentaNYL (SUBLIMAZE) injection 100 mcg  100 mcg Intravenous Q15 MIN PRN Christopher Krishna M.D.        And   • fentaNYL (SUBLIMAZE) 50 mcg/mL in 50mL (Continuous Infusion)   Intravenous Continuous Christopher Krishna M.D.        And   • propofol (DIPRIVAN) injection  0-40 mcg/kg/min Intravenous Continuous Christopher Krishna M.D. 8.8 mL/hr at 04/13/20 0359 20 mcg/kg/min at 04/13/20 0359   • 3% sodium chloride (HYPERTONIC SALINE) 500mL infusion   Intravenous Continuous Christopher Krishna M.D.           Allergies  Allergies   Allergen Reactions   •  Percocet [Oxycodone-Acetaminophen] Nausea   • Codeine Vomiting and Nausea       Vital Signs last 24 hours  Pulse:  [106-120] 106  Resp:  [9-135] 24  BP: (146-165)/(59-73) 146/59  SpO2:  [91 %-98 %] 98 %    Physical Exam  Physical Exam  Vitals signs (Elderly fragile female with pinpoint unresponsive pupils flaccid bilateral lower extremities no spontaneous movement not following commands with a GCS approximately 4) and nursing note reviewed.   HENT:      Head: Normocephalic.      Nose: Nose normal.      Mouth/Throat:      Mouth: Mucous membranes are dry.   Eyes:      Comments: Constricted pupils pinpoint nonreactive to light   Cardiovascular:      Rate and Rhythm: Regular rhythm. Tachycardia present.      Pulses: Normal pulses.   Pulmonary:      Breath sounds: Stridor present. Rhonchi present.      Comments: Grunting respirations on admission here I intubated her urgently/emergently on presentation  Abdominal:      Palpations: Abdomen is soft.   Skin:     General: Skin is warm and dry.      Capillary Refill: Capillary refill takes 2 to 3 seconds.   Neurological:      Comments: Unresponsive flaccid bilaterally not following commands         Fluids  No intake or output data in the 24 hours ending 04/13/20 0434    Laboratory  Recent Results (from the past 48 hour(s))   CBC WITH DIFFERENTIAL    Collection Time: 04/13/20  2:20 AM   Result Value Ref Range    WBC 17.0 (H) 4.8 - 10.8 K/uL    RBC 3.32 (L) 4.20 - 5.40 M/uL    Hemoglobin 10.0 (L) 12.0 - 16.0 g/dL    Hematocrit 32.4 (L) 37.0 - 47.0 %    MCV 97.6 81.4 - 97.8 fL    MCH 30.1 27.0 - 33.0 pg    MCHC 30.9 (L) 33.6 - 35.0 g/dL    RDW 51.7 (H) 35.9 - 50.0 fL    Platelet Count 157 (L) 164 - 446 K/uL    MPV 11.2 9.0 - 12.9 fL    Neutrophils-Polys 88.60 (H) 44.00 - 72.00 %    Lymphocytes 5.30 (L) 22.00 - 41.00 %    Monocytes 4.80 0.00 - 13.40 %    Eosinophils 0.00 0.00 - 6.90 %    Basophils 0.20 0.00 - 1.80 %    Immature Granulocytes 1.10 (H) 0.00 - 0.90 %    Nucleated RBC  0.00 /100 WBC    Neutrophils (Absolute) 15.08 (H) 2.00 - 7.15 K/uL    Lymphs (Absolute) 0.91 (L) 1.00 - 4.80 K/uL    Monos (Absolute) 0.82 0.00 - 0.85 K/uL    Eos (Absolute) 0.00 0.00 - 0.51 K/uL    Baso (Absolute) 0.03 0.00 - 0.12 K/uL    Immature Granulocytes (abs) 0.19 (H) 0.00 - 0.11 K/uL    NRBC (Absolute) 0.00 K/uL   Comp Metabolic Panel    Collection Time: 04/13/20  2:20 AM   Result Value Ref Range    Sodium 128 (L) 135 - 145 mmol/L    Potassium 3.9 3.6 - 5.5 mmol/L    Chloride 93 (L) 96 - 112 mmol/L    Co2 23 20 - 33 mmol/L    Anion Gap 12.0 7.0 - 16.0    Glucose 121 (H) 65 - 99 mg/dL    Bun 23 (H) 8 - 22 mg/dL    Creatinine 1.05 0.50 - 1.40 mg/dL    Calcium 8.7 8.5 - 10.5 mg/dL    AST(SGOT) 57 (H) 12 - 45 U/L    ALT(SGPT) 43 2 - 50 U/L    Alkaline Phosphatase 116 (H) 30 - 99 U/L    Total Bilirubin 0.4 0.1 - 1.5 mg/dL    Albumin 3.2 3.2 - 4.9 g/dL    Total Protein 5.8 (L) 6.0 - 8.2 g/dL    Globulin 2.6 1.9 - 3.5 g/dL    A-G Ratio 1.2 g/dL   TROPONIN    Collection Time: 04/13/20  2:20 AM   Result Value Ref Range    Troponin T 117 (H) 6 - 19 ng/L   APTT    Collection Time: 04/13/20  2:20 AM   Result Value Ref Range    APTT 22.4 (L) 24.7 - 36.0 sec   Prothrombin Time    Collection Time: 04/13/20  2:20 AM   Result Value Ref Range    PT 14.0 12.0 - 14.6 sec    INR 1.05 0.87 - 1.13   MAGNESIUM    Collection Time: 04/13/20  2:20 AM   Result Value Ref Range    Magnesium 1.8 1.5 - 2.5 mg/dL   PHOSPHORUS    Collection Time: 04/13/20  2:20 AM   Result Value Ref Range    Phosphorus 2.9 2.5 - 4.5 mg/dL   LACTIC ACID    Collection Time: 04/13/20  2:20 AM   Result Value Ref Range    Lactic Acid 1.4 0.5 - 2.0 mmol/L   ESTIMATED GFR    Collection Time: 04/13/20  2:20 AM   Result Value Ref Range    GFR If African American >60 >60 mL/min/1.73 m 2    GFR If Non African American 50 (A) >60 mL/min/1.73 m 2   EKG    Collection Time: 04/13/20  2:31 AM   Result Value Ref Range    Report       [facility]    Test Date:   2020  Pt Name:    KIRILL CUMMINGS                 Department: [facility]  MRN:        [id number]                      Room:       R103  Gender:     Female                       Technician: RAAD  :        [id number]                   Requested By:EDMOND HERRMANN  Order #:    [id number]                    Reading MD:    Measurements  Intervals                                Axis  Rate:       117                          P:          60  NC:         157                          QRS:        4  QRSD:       87                           T:          185  QT:         302  QTc:        422    Interpretive Statements  SINUS TACHYCARDIA WITH IRREGULAR RATE  REPOL ABNRM, SEVERE GLOBAL ISCHEMIA (LM/MVD)  Compared to ECG 10/01/2015 09:33:08  Early repolarization now present  Possible ischemia now present  Sinus bradycardia no longer present     ISTAT ARTERIAL BLOOD GAS    Collection Time: 20  2:46 AM   Result Value Ref Range    Ph 7.193 (LL) 7.400 - 7.500    Pco2 65.9 (HH) 26.0 - 37.0 mmHg    Po2 192 (H) 64 - 87 mmHg    Tco2 27 20 - 33 mmol/L    S02 99 93 - 99 %    Hco3 25.4 (H) 17.0 - 25.0 mmol/L    BE -4 -4 - 3 mmol/L    Body Temp 98.6 F degrees    O2 Therapy 100 %    iPF Ratio 192     Ph Temp Tara 7.193 (LL) 7.400 - 7.500    Pco2 Temp Co 65.9 (HH) 26.0 - 37.0 mmHg    Po2 Temp Cor 192 (H) 64 - 87 mmHg    Specimen Arterial     Action Range Triggered YES     Inst. Qualified Patient YES        Imaging      Assessment/Plan  Acute MI, anterolateral wall (HCC)  Assessment & Plan  Patient has diffuse ST depressions on her EKG in her anterolateral leads.  This could represent a focal ischemia/acute MI.  However she also has a complicating factor of severe neurologic injury and diffuse EKG changes similar to these can sometimes occur from this in the absence of coronary ischemia.    Plan  - echo  - trend troponins  - not a candidate for cardiac interventions given her poor neurologic picture  - hold on ASA, heparin pending neuro picture  and neurosurgical recommendations    Post-operative complication  Assessment & Plan  Patient had a left carotid endarterectomy at the outside facility and showed evidence of a perioperative stroke. Despite clot removal and take back to the operating room, she suffered a rapidly  progressive non-hemorrhagi stroke at the outside facility with a midline shift by CT    Acute respiratory failure with hypoxia and hypercapnia (HCC)  Assessment & Plan  The patient on arrival here show evidence of poor airway protection and crying respirations.  Her saturations were in the high 90s.  I intubated her emergently without a blood gas prior.  She will blood gas shows hypercapnic findings but adequate oxygenation.  Her chest x-ray shows no infiltrates. Her endotracheal tube is 2.5 cm above the juan josé.  Her target PO2 is greater than 55-60 and the pH in normal range.    Arterial ischemic stroke, ICA (internal carotid artery), left, acute (HCC)- (present on admission)  Assessment & Plan  Patient had a left carotid endarterectomy at the outside hospital 2 days ago for symptomatic tight stenosis.  Her perioperative course was complicated by restenosis and was taken back to the OR.  However, her stroke was reported to progress and prior to transfer here she was showing signs of midline shift and poor level of responsiveness flaccid right upper extremity, nonverbal.  By the time she arrived here she was flaccid, unresponsive with minimal to no cough and gag and constricted fixed pupils not responding to verbal stimuli with a [place] Coma Score of about 4. However she had an adequate and pressure.    Her clinical picture is consistent with a stroke and progression and increased brain swelling and early herniation.  On presentation here I gave her mannitol 50, hypertonic saline 23.4% 30 cc over 10 minutes.  Her goal sodium is 155-1 60, her blood pressure target is maintaining a systolic pressure greater than 140, MAP > 70.  Dr. Luo from  neurosurgery feels is likely she is taking aspirin and possibly Plavix post CEA and he notes that she is a poor craniotomy candidate, given her overall clinical picture, that she is also a poor EVD candidate as well.  He recommends medical management as we are doing with hypertonic saline, goal blood pressure and sodium 155-160 and serial examinations.  Dr. Luo worries that her prognosis is for survival and certainly for return to any kind of independent function is poor.  Dr. Luo does not feel that repeat CTs of the head would be helpful at this point.  He further recommends if patient does not show critical illness improvement in the near future to discuss goals of care with the patient and change goals of care to comfort measures only given her grim overall picture.      Discussed patient condition and risk of morbidity and/or mortality with Hospitalist, RN, RT, Therapies, Charge nurse / hot rounds and neurosurgery.    The patient remains critically ill.  Critical care time = 160 minutes in directly providing and coordinating critical care and extensive data review.  No time overlap and excludes procedures. Patient critically ill with a life-threatening neurologic injury plus an acute MI. Required repeated neuro exams, review of data, discussions with the ICU team and neurosurgery.    This note will also stand as this patient's history and physical on presentation to this hospital.

## 2020-04-13 NOTE — CONSULTS
Reason for PC Consult: Advance Care Planning    Consulted by: Dr. Krishna    Assessment:  General:   Ms. Carver is an 80 year old lady with the past medical history significant for AAA s/p stent graft in 2015, hypertension, hypothyroidism, PVD who underwent a previous right CEA in 11/2019, but underwent left CEA on 4/11/2020 for critical stenosis. According to chart notes from [facility], the patient had decreased movement to the RUE/RLE immediately post-operatively.  An ultrasound showed thrombus in the left ICA and was given additional heparin and taken back to the OR for clot removal.  Over the course of the next 12- 24 hours on 4/12, the patient had worsening stroke symptoms of flaccid paralysis on the RUE/RLE and had worsening responsiveness/non-verbal.  A CT head from [facility] 5 hours prior to transfer to [facility] showed a large subacute nonhemorrhagic stroke involving the left front, parietal, and temporal lobes in the distribution of the left MCA with mass effect and midline shift towards the right of 3mm.  The patient was transferred from [facility] for neurosurgery evaluation.  She was immediately intubated upon arrival.  NSG was consulted and could not offer any surgical interventions due to the patient's instability.     Dyspnea: Yes- Intubated  Last BM: (PTA)  Pain: Unable to determine  Depression: Unable to determine  Dementia: Unable to Determine    Spiritual:  Is Muslim or spirituality important for coping with this illness? No  Has a  or spiritual provider visit been requested? No    Palliative Performance Scale: 10%    Advance Directive: Not on file  DPOA: Not on file  POLST: No    Code Status: Full changed to DNR     Social: Pt lives in [place] with her  [place] and her son Clarence. Pt has two other children, Tori and Alejandro, that live in [place].     Outcome:  Along with Dr. Zafar and BS KARRI Cedeño, introduced self and role of Palliative Care to pt's children, Alejandro Rodríguez, and Clarence via conference  "call. Pt's  [place] has Parkinson's and chose not to join for this conversation. Dr. Zafar had previously updated Clarence. Gala had updated Alejandro who kept Tori informed. MD re-iterated that the pt had a \"devastating\" stroke to the left side of her brain. MD expresses concern that pt would be dependent on the ventilator for the rest of her life. MD also explains that pt is having an MI that we cannot fix due to the high risk of bleeding in the brain.     Pt's family all agrees that pt \"wouldn't want to live this way.\" MD explains comfort care and all family agrees that comfort aligns with pt's wishes. Plan made for pt's son Clarence to bring her  to bedside to say goodbye. Anne and Alejandro decline a Zoom visit. PC RN explains process of extubation for comfort.     Spiritual visit declined. Active listening, reflection, validation & normalization, and empathic support utilized throughout this encounter.  All questions answered.  PC contact information given.       Updated: All team members present.     Plan: comfort care after pt's  comes to bedside to say goodbye. Please reach out to PC RN for any needs.         Thank you for allowing Palliative Care to participate in this patient's care. Please feel free to call [phone] with any questions or concerns.  "

## 2020-04-13 NOTE — CONSULTS
DATE OF SERVICE:  04/13/2020    NEUROSURGERY CONSULTATION    REASON FOR CONSULTATION:  Left MCA stroke.    HISTORY OF PRESENT ILLNESS:  This is an 80-year-old woman who presents to   [facility] as a direct transfer from [facility].  She has an extensive vascular history with a carotid   endarterectomy performed approximately 2 months ago on her right carotid and   then subsequently had a left carotid endarterectomy performed on the 10th by   vascular surgery.  Postoperatively, the patient is noted to have a left MCA   stroke, which she is being treated conservatively for at [facility].    This evening, approximately 11:00 p.m., the patient had symptoms of decline.    Before this, per neurology, the patient was having dense hemiplegia on the   right side with expressive aphasia with still the ability to follow commands   on the left side.  It is unclear when that exam was valid and she had a CT   scan at approximately 9 p.m. at [facility] demonstrating approximately 3 mm of   midline shift.  The patient was transferred to [facility]   where she arrived obtunded with bilateral pinpoint pupils.  She was given   mannitol per report prior to being sent.  On arrival, per the ICU, the patient   was having guppy breathing and concern for herniation.  She was given 23% and   an additional dose of mannitol per the ICU and was noted at that time to   require intubation.  She was paralyzed and intubated approximately 40 minutes   before neurosurgery was able to arrive and notified.  At this time, the   patient is intubated, not sedated.  She has no movement x4.  Her pupils are 2   mm and minimally reactive.  Imaging demonstrated a significant density within   the left MCA territory consistent with dense left MCA stroke.  She has   approximately 3 mm of midline shift.  We do not have labs from the outside   hospital; however, it is likely she is on aspirin given  that she has just had   2 CEAs and stroke.  The patient at the time of arrival has symptoms on EKG   consistent with a heart attack.  Given her overall age, her left MCA stroke,   her bilateral CEAs, her pulmonary acute failure, likely from multiple factors   including intracranial vascular incident, and her new myocardial infarction,   we feel that she is an extremely poor surgical candidate and at this time,   with unknown platelet dysfunction, we feel that we can manage her with maximal   medical management without a direct intracranial pressure monitor, as this   would require extensive platelet reversal to place and put her at extreme risk   for intraparenchymal hematoma on her good side where she does not currently   have a stroke.    Multiple attempts were made to reach the son through 3 different phone calls   on 3 different phones, several of which were from my personal cell phone as   well as several attempts to both the patient's cell phone, the son's listed   cell phone in the system, which apparently was a different family member, and   then this listed cell phone number from [facility] for the patient's son,   none of which we were able to reach the family for direct consultation to   relay information on appropriate medical therapy or thoughts in our guidance   for the family.    REVIEW OF SYSTEMS:  A 12-point review of systems unable to obtain.    PAST MEDICAL HISTORY:  Per HPI.    PAST SURGICAL HISTORY:  Per HPI.    MEDICATIONS:  Please see EMR.  The patient should be on aspirin ____ 325 daily   for her CEAs to ensure that she does not have progressive occlusion of the   carotids as well as during the stroke.    PHYSICAL EXAMINATION:  Our physical examination on arrival is she has no   movement x4 due to residual paralytic that was on board for her intubation.    She is intubated, nonresponsive.  She has pinpoint pupils that are 2 and   nonreactive.  Her exam prior to intubation per the ICU was  that she was having   agonal breathing.  She was not responsive.  She would posture on the   patient's left side.  She had no movement on the right side.    IMAGING:  CT scan demonstrates a hyperdensity consistent with complete left   MCA stroke on the patient's left side.  She has some moderate midline shift of   approximately 3 mm.  She does not demonstrate any signs of obstructive   hydrocephalus with no transependymal flow and no gross dilation of the   supratentorial ventricular system.    ASSESSMENT AND PLAN:  At this time, this is an 80-year-old woman who was   transferred to [facility] for critical care and   consultation for potential advanced interventions.  Given the patient's   advanced age, her dense left middle cerebral artery stroke, and now myocardial   infarction, we feel that she would be a very poor surgical candidate for a   hemicraniectomy.  We do not feel that she would have a significant survival   benefit from performing a surgery such as that, as she would likely have a   significant deficit if she were able to survive through the surgery and be   able to matriculate to an long-term acute care facility.  She would likely   require tracheostomy and PEG tube, if she is able to survive through both the   surgical intervention and subsequent perioperative period.  Given that she   arrived in a very deteriorated state from her transfer request exam and on   arrival had evidence on EKG of an acute myocardial infarction, she is becoming   an even more unstable and poor surgical candidate.  Additionally, she was   being evaluated for potential requirements for an intracranial monitor.  The   patient should be on aspirin therapy.  Her requirements for placement of a   drain would be if she had gross dilation of the ventricular system,   significant midline shift, which she does not.  In addition, she would require   antiplatelet reversal, which would put her at increased stress  and concern   for potential causing additional strokes, given she has bilateral carotid   endarterectomies, and may still lead to her having a large stroke in the right   frontal region, which would be devastating for this patient from a medical   standpoint.  Given that she would be having this used for direction of   hypertonic therapy, our suggestion is to treat her as if she has elevated   intracranial pressure and maximize her hypertonic therapy with goals in that   sodiums greater than 155.  She can be placed on Keppra 500 b.i.d.  Her neuro   checks can be placed q. 2 hours with the goal of maximizing her intracranial   management from a medical standpoint alone.  This would be an undirected   hypertonic therapy; however, this is common practice for patients with liver   failure, who are coagulopathic, with a goal of maximizing the medical   treatment with the understanding that you are treating an intracranial   pressure that is elevated within it directly.  At this point, there is no role   for surgery, as this would likely not alter her ultimate endpoint.  In   addition, the patient does not show gross midline shift of substantial amount   based on her imaging that was sent from [facility].  Serial CTs would be   likely beneficial to see if she continues to move across midline.  However, at   this point, I do not feel that she is medically stable for other   interventions and they may not give her any survival benefit at this time.    This was discussed in detail with the critical care team, who are supplying   the additional maximal medical intervention.  Again, substantial effort was   made to contact the family to discuss these issues and no family was able to   answer the phone to discuss our feelings as well as our suggestions for this   patient.  We will continue to follow at this time.  However, given the   progression in the patient, there is likely nothing neurosurgical that would   benefit  her at this time or in the future.    Total of 75 minutes were spent in consultation, review of the patient's   imaging, and in significant efforts trying to obtain cell phone numbers, phone   numbers, and calling family members that were unavailable at this time.       ____________________________________     MD KEITH Marquez / ANGELLA    DD:  04/13/2020 03:12:54  DT:  04/13/2020 07:16:57    D#:  [id number]  Job#:  [id number]

## 2020-04-13 NOTE — CARE PLAN
Problem: Acute Care of the Stroke Patient  Goal: Optimal Outcome for the Stroke patient  Outcome: PROGRESSING AS EXPECTED  Intervention: Vital Signs and Neuro Checks per order  Note: On going  Intervention: NIHSS completed and documented  Note: completed  Intervention: Telemetry monitoring (discontinue after 24 hours unless contraindicated. If monitoring required beyond 24 hours obtain order for monitored bed)  Note: On going  Intervention: CT Scan results (document hemorrhage or no)  Note: Prior scans loaded into epic

## 2020-04-13 NOTE — CARE PLAN
Problem: Ventilation Defect:  Goal: Ability to achieve and maintain unassisted ventilation or tolerate decreased levels of ventilator support  Outcome: PROGRESSING AS EXPECTED   VD1  7.5-22  24  310  +8  50%

## 2020-04-13 NOTE — PROGRESS NOTES
Updates given to patient son Alejandro who lives in [place]. Per Alejandro he will update patients daughter Tori.

## 2020-04-13 NOTE — PROCEDURES
Intubation  Date/Time: 4/13/2020 3:11 AM  Performed by: Christopher Krishna M.D.  Authorized by: Christopher Krishna M.D.     Consent:     Consent obtained:  Emergent situation    Risks discussed:  Aspiration, brain injury, dental trauma, laryngeal injury, pneumothorax, hypoxia, death and bleeding    Alternatives discussed:  Delayed treatment  Pre-procedure details:     Patient status:  Unresponsive    Pretreatment medications:  Fentanyl    Paralytics:  Rocuronium  Procedure details:     Preoxygenation:  Bag valve mask    CPR in progress: no      Intubation method:  Oral    Laryngoscope type:  GlideScope    Laryngoscope blade:  Mac 3    Tube size (mm):  7.5    Tube type:  Cuffed    Number of attempts:  1  Placement assessment:     ETT to lip:  22    ETT to teeth:  21    Tube secured with:  ETT zhong    Breath sounds:  Equal and absent over the epigastrium    Placement verification: chest rise, condensation, CXR verification, ETCO2 detector and tube exhalation      CXR findings:  ETT in proper place  Comments:      Indication: poor airway protection

## 2020-04-13 NOTE — PROGRESS NOTES
2 RN skin check complete with KARRI Parson   Devices in place BP cuff, tele leads, spo2 probe, gamino catheter.  Skin assessed under devices yes.  Confirmed pressure ulcers found on N/A.  New potential pressure ulcers noted on N/A. Blanchable redness to sacrum and bilateral elbows  The following interventions in place Pillows in place for support, q2hr turns, device location rotation.*

## 2020-04-13 NOTE — PALLIATIVE CARE
Palliative Care follow-up  PC RN discussed pt with Dr. Zafar and Gala PUTNAM RN. PC RN left VM for pt's son Clarence to set time for family conference via phone.       Updated: INDY RN and MD    Plan: await call back from family to set time for conference via phone.     Thank you for allowing Palliative Care to support this patient and family. Contact [phone] for additional assistance, change in patient status, or with any questions/concerns.

## 2020-04-13 NOTE — PROGRESS NOTES
Call made to [facility], spoke with Dipak ROLLINS for referral.  patient is too old for organ donation but please call back with cardiac death.    Referral number: [id number]

## 2020-04-14 NOTE — PROGRESS NOTES
Spiritual Care Note    Patient Information     Patient's Name: Jasmin Carver   MRN: [id number]    YOB: 1940   Age and Gender: 80 y.o. female   Service Area: [facility]   Room (and Bed): R103/   Ethnicity or Nationality:     Primary Language: English   Catholic/Spiritual preference: Anabaptist   Place of Residence: [place], [place]   Family/Friends/Others Present: no   Clinical Team Present: Nurse   Medical Diagnosis(-es)/Procedure(s): Hypomagnesemia   Noted: 4/13/2020   Arterial ischemic stroke, ICA (internal carotid artery), left, acute (HCC)   Noted: 4/13/2020   Acute respiratory failure with hypoxia and hypercapnia (HCC)   Noted: 4/13/2020   Post-operative complication   Noted: 4/13/2020   Acute MI, anterolateral wall (HCC)   Noted: 4/13/2020      Code Status: Comfort Care/DNR    Date of Admission: 4/13/2020   Length of Stay: 0 days        Spiritual Care Provider Information:  Name of Spiritual Care Provider: Kymberly Wells  Title of Spiritual Care Provider: Associate   Phone Number: [phone]  E-mail: [email or web address]  Total time : 15 minutes    Spiritual Screen Results:    Gen Nursing        Palliative Care  PC Catholic/Spiritual Screening  Is your spiritual health or inner well-being important to you as you cope with your medical condition?: Yes  Would you like to receive a visit from our Spiritual Care team or your own Episcopalian or spiritual leader?: Yes  PC Sprituality screening comment: spiritual care order placed/spoke with Oni Valero      Encounter/Request Information  Encounter/Request Type   Visited With: Patient, Health care provider  Nature of the Visit: Initial, On shift  Continue Visiting: No  Crisis Visit: Critical care, Patient actively dying/EOL(Pt moved to )  Referral From/ Origin of Request: Verbal staff, Phone, Epic nursing    Religous Needs/Values  Catholic Needs Visit    Catholic Needs: Prayer    Ritual Needs Visit    Ritual  Needs: EOL ritual    Spiritual Assessment   Spiritual Care Encounters    Observations/Symptoms: (Pt intub., unrespons)    Interacton/Conversation:  responded to spiritual care request from nurse via pt's son for EOL prayer for pt. Pt had been moved to CC status, but was still intubated until  could come to say goodbye.  checked in with nurse before entering pt's room. Nurse informed  that son believed pt followed [employer] Restorationism tradition.  offered Jain prayer in the name of Ramo, the Lord's Prayer, and prayer for EOL. Nurse thanked  for visit, stating  was on his way.  informed it was not necessary to wait for pt's  for prayer. Nurse grateful for 's visit.    Assessment: Need  Need: Family Issues/Concern, Seeking Spiritual Assistance and Support(Family desired prayer for pt at EOL)    Interventions: EOL prayer/ritual    Plan: No Further Visits    Notes:

## 2020-04-14 NOTE — RESPIRATORY CARE
Extubation    Pt extubated per Comfort Care Orders    Cuff leak noted yes  Stridor present no      Patient tolerated well

## 2020-04-14 NOTE — DISCHARGE SUMMARY
Hospital Medicine Death Summary    Date of Note  4/14/2020    Primary Care Physician  Rodrigo Ferguson M.D.    Death Date: 04/13/20  Death Time: 1850    Cause of Death  Acute Hypoxic Respiratory Failure secondary to left MCA ischemic stroke with mass effect and midline shift.    Comorbid Conditions  Patient Active Problem List   Diagnosis   • Abdominal aortic aneurysm (HCC)   • Acute lower GI bleeding   • Diverticulosis   • Hemorrhoids   • Arterial ischemic stroke, ICA (internal carotid artery), left, acute (HCC)   • Acute respiratory failure with hypoxia and hypercapnia (HCC)   • Post-operative complication   • Acute MI, anterolateral wall (HCC)   • Hypomagnesemia       History of Presenting Illness and Hospital Course  Ms. Carver is an 80 year old lady with the past medical history significant for AAA s/p stent graft in 2015, hypertension, hypothyroidism, PVD who underwent a previous right CEA in 11/2019, but underwent left CEA on 4/11/2020 for critical stenosis.  According to chart notes from [facility], the patient had decreased movement to the RUE/RLE immediately post-operatively.  An ultrasound showed thrombus in the left ICA and was given additional heparin and taken back to the OR for clot removal.  Over the course of the next 12- 24 hours on 4/12, the patient had worsening stroke symptoms of flaccid paralysis on the RUE/RLE and had worsening responsiveness/non-verbal.  A CT head from [facility] 5 hours prior to transfer to [facility] showed a large subacute nonhemorrhagic stroke involving the left frontal, parietal, and temporal lobes in the distribution of the left MCA with mass effect and midline shift towards the right of 3mm.  The patient was transferred from [facility] for neurosurgery evaluation.  She was immediately intubated upon arrival.  NSG was consulted and could not offer any surgical interventions due to the patient's instability.  A central line was placed due to hypotension possibly related to neurogenic shock, but due  to rising troponin levels also possibly cardiogenic shock.  Palliative care was consulted and a family conference was held over the phone that included the patient's two sons and daughters to discuss goals of care.  The family spoke with their father and all were in agreement that their mother would not want to be attached to machines.  They elected to transition to comfort care later in the evening on 4/13 and the patient was compassionately extubated.  She passed away peacefully with her  at the bedside at 1850.    Consultants  Dr. Christopher Luo, Neurosurgery  Porsha Aguero R.N, Palliative Care    Procedures  Central line placement on 4/13